# Patient Record
Sex: FEMALE
[De-identification: names, ages, dates, MRNs, and addresses within clinical notes are randomized per-mention and may not be internally consistent; named-entity substitution may affect disease eponyms.]

---

## 2019-03-06 ENCOUNTER — HOSPITAL ENCOUNTER (EMERGENCY)
Dept: HOSPITAL 84 - D.ER | Age: 19
LOS: 1 days | Discharge: HOME | End: 2019-03-07
Payer: COMMERCIAL

## 2019-03-06 VITALS — HEIGHT: 65 IN | WEIGHT: 150.32 LBS | BODY MASS INDEX: 25.04 KG/M2

## 2019-03-06 DIAGNOSIS — N93.8: Primary | ICD-10-CM

## 2019-03-06 LAB
ALBUMIN SERPL-MCNC: 3.8 G/DL (ref 3.4–5)
ALP SERPL-CCNC: 76 U/L (ref 46–116)
ALT SERPL-CCNC: 23 U/L (ref 10–68)
ANION GAP SERPL CALC-SCNC: 13.5 MMOL/L (ref 8–16)
APPEARANCE UR: CLEAR
BACTERIA #/AREA URNS HPF: (no result) /HPF
BILIRUB SERPL-MCNC: 0.16 MG/DL (ref 0.2–1.3)
BILIRUB SERPL-MCNC: NEGATIVE MG/DL
BUN SERPL-MCNC: 10 MG/DL (ref 7–18)
CALCIUM SERPL-MCNC: 8.7 MG/DL (ref 8.5–10.1)
CHLORIDE SERPL-SCNC: 105 MMOL/L (ref 98–107)
CO2 SERPL-SCNC: 27.1 MMOL/L (ref 21–32)
COLOR UR: (no result)
CREAT SERPL-MCNC: 0.8 MG/DL (ref 0.6–1.3)
ERYTHROCYTE [DISTWIDTH] IN BLOOD BY AUTOMATED COUNT: 13 % (ref 11.5–14.5)
GLOBULIN SER-MCNC: 3.8 G/L
GLUCOSE SERPL-MCNC: 90 MG/DL (ref 74–106)
GLUCOSE SERPL-MCNC: NEGATIVE MG/DL
HCG SERPL-ACNC: NEGATIVE M[IU]/ML
HCT VFR BLD CALC: 35.8 % (ref 36–48)
HGB BLD-MCNC: 11.9 G/DL (ref 12–16)
KETONES UR STRIP-MCNC: NEGATIVE MG/DL
LYMPHOCYTES NFR BLD AUTO: 17.9 % (ref 15–50)
MCH RBC QN AUTO: 27.2 PG (ref 26–34)
MCHC RBC AUTO-ENTMCNC: 33.2 G/DL (ref 31–37)
MCV RBC: 81.7 FL (ref 80–100)
NEUTROPHILS NFR BLD AUTO: 65.2 % (ref 40–80)
NITRITE UR-MCNC: NEGATIVE MG/ML
OSMOLALITY SERPL CALC.SUM OF ELEC: 281 MOSM/KG (ref 275–300)
PH UR STRIP: 8 [PH] (ref 5–6)
PLATELET # BLD: 138 10X3/UL (ref 130–400)
PMV BLD AUTO: 12.2 FL (ref 7.4–10.4)
POTASSIUM SERPL-SCNC: 3.6 MMOL/L (ref 3.5–5.1)
PROT SERPL-MCNC: 7.6 G/DL (ref 6.4–8.2)
PROT UR-MCNC: NEGATIVE MG/DL
RBC # BLD AUTO: 4.38 10X6/UL (ref 4–5.4)
RBC #/AREA URNS HPF: (no result) /HPF (ref 0–5)
SODIUM SERPL-SCNC: 142 MMOL/L (ref 136–145)
SP GR UR STRIP: 1 (ref 1–1.02)
SQUAMOUS #/AREA URNS HPF: (no result) /HPF (ref 0–5)
UROBILINOGEN UR-MCNC: NORMAL MG/DL
WBC # BLD AUTO: 4.9 10X3/UL (ref 4.8–10.8)
WBC #/AREA URNS HPF: (no result) /HPF (ref 0–5)

## 2019-03-07 VITALS — SYSTOLIC BLOOD PRESSURE: 125 MMHG | DIASTOLIC BLOOD PRESSURE: 74 MMHG

## 2020-09-23 ENCOUNTER — HOSPITAL ENCOUNTER (OUTPATIENT)
Dept: HOSPITAL 84 - D.LDO | Age: 20
Discharge: HOME | End: 2020-09-23
Attending: OBSTETRICS & GYNECOLOGY
Payer: SELF-PAY

## 2020-09-23 VITALS — BODY MASS INDEX: 25 KG/M2

## 2020-09-23 DIAGNOSIS — O35.9XX0: Primary | ICD-10-CM

## 2020-09-23 LAB
ALT SERPL-CCNC: 16 U/L (ref 10–68)
ANION GAP SERPL CALC-SCNC: 13.9 MMOL/L (ref 8–16)
BUN SERPL-MCNC: 6 MG/DL (ref 7–18)
CALCIUM SERPL-MCNC: 8.7 MG/DL (ref 8.5–10.1)
CHLORIDE SERPL-SCNC: 105 MMOL/L (ref 98–107)
CO2 SERPL-SCNC: 22.5 MMOL/L (ref 21–32)
CREAT SERPL-MCNC: 0.6 MG/DL (ref 0.6–1.3)
ERYTHROCYTE [DISTWIDTH] IN BLOOD BY AUTOMATED COUNT: 12.2 % (ref 11.5–14.5)
GLUCOSE SERPL-MCNC: 109 MG/DL (ref 74–106)
HCT VFR BLD CALC: 35.2 % (ref 36–48)
HGB BLD-MCNC: 11.8 G/DL (ref 12–16)
MCH RBC QN AUTO: 28.6 PG (ref 26–34)
MCHC RBC AUTO-ENTMCNC: 33.5 G/DL (ref 31–37)
MCV RBC: 85.4 FL (ref 80–100)
OSMOLALITY SERPL CALC.SUM OF ELEC: 274 MOSM/KG (ref 275–300)
PLATELET # BLD: 139 10X3/UL (ref 130–400)
PMV BLD AUTO: 11.7 FL (ref 7.4–10.4)
POTASSIUM SERPL-SCNC: 3.4 MMOL/L (ref 3.5–5.1)
RBC # BLD AUTO: 4.12 10X6/UL (ref 4–5.4)
SODIUM SERPL-SCNC: 138 MMOL/L (ref 136–145)
URATE SERPL-MCNC: 5.2 MG/DL (ref 2.6–7.2)
WBC # BLD AUTO: 7.4 10X3/UL (ref 4.8–10.8)

## 2020-09-30 ENCOUNTER — HOSPITAL ENCOUNTER (INPATIENT)
Dept: HOSPITAL 84 - D.LD | Age: 20
LOS: 1 days | Discharge: HOME | DRG: 951 | End: 2020-10-01
Attending: OBSTETRICS & GYNECOLOGY | Admitting: OBSTETRICS & GYNECOLOGY
Payer: MEDICAID

## 2020-09-30 VITALS
WEIGHT: 186.39 LBS | WEIGHT: 186.39 LBS | BODY MASS INDEX: 29.96 KG/M2 | BODY MASS INDEX: 29.96 KG/M2 | HEIGHT: 66 IN | HEIGHT: 66 IN

## 2020-09-30 DIAGNOSIS — Z34.93: Primary | ICD-10-CM

## 2020-09-30 DIAGNOSIS — Z3A.39: ICD-10-CM

## 2020-09-30 LAB
ERYTHROCYTE [DISTWIDTH] IN BLOOD BY AUTOMATED COUNT: 12.2 % (ref 11.5–14.5)
HCT VFR BLD CALC: 35.7 % (ref 36–48)
HGB BLD-MCNC: 12.1 G/DL (ref 12–16)
MCH RBC QN AUTO: 28.7 PG (ref 26–34)
MCHC RBC AUTO-ENTMCNC: 33.9 G/DL (ref 31–37)
MCV RBC: 84.8 FL (ref 80–100)
PLATELET # BLD: 170 10X3/UL (ref 130–400)
PMV BLD AUTO: 12.1 FL (ref 7.4–10.4)
RBC # BLD AUTO: 4.21 10X6/UL (ref 4–5.4)
WBC # BLD AUTO: 9.9 10X3/UL (ref 4.8–10.8)

## 2020-10-01 VITALS — SYSTOLIC BLOOD PRESSURE: 104 MMHG | DIASTOLIC BLOOD PRESSURE: 51 MMHG

## 2020-10-01 LAB
AMPHETAMINES UR QL SCN: NEGATIVE QUAL
BARBITURATES UR QL SCN: NEGATIVE QUAL
BENZODIAZ UR QL SCN: NEGATIVE QUAL
BZE UR QL SCN: NEGATIVE QUAL
CANNABINOIDS UR QL SCN: NEGATIVE QUAL
OPIATES UR QL SCN: NEGATIVE QUAL
PCP UR QL SCN: NEGATIVE QUAL

## 2020-10-02 ENCOUNTER — HOSPITAL ENCOUNTER (OUTPATIENT)
Dept: HOSPITAL 84 - D.LDO | Age: 20
Discharge: HOME | End: 2020-10-02
Attending: OBSTETRICS & GYNECOLOGY
Payer: MEDICAID

## 2020-10-02 VITALS — BODY MASS INDEX: 30 KG/M2

## 2020-10-02 DIAGNOSIS — O35.9XX0: Primary | ICD-10-CM

## 2020-10-04 ENCOUNTER — HOSPITAL ENCOUNTER (INPATIENT)
Dept: HOSPITAL 84 - D.LD | Age: 20
LOS: 2 days | Discharge: HOME | End: 2020-10-06
Attending: OBSTETRICS & GYNECOLOGY | Admitting: OBSTETRICS & GYNECOLOGY
Payer: MEDICAID

## 2020-10-04 VITALS — DIASTOLIC BLOOD PRESSURE: 90 MMHG | SYSTOLIC BLOOD PRESSURE: 126 MMHG

## 2020-10-04 VITALS — BODY MASS INDEX: 29.73 KG/M2 | WEIGHT: 185 LBS | HEIGHT: 66 IN

## 2020-10-04 DIAGNOSIS — Z3A.39: ICD-10-CM

## 2020-10-04 LAB
AMPHETAMINES UR QL SCN: NEGATIVE QUAL
BARBITURATES UR QL SCN: NEGATIVE QUAL
BASOPHILS NFR BLD AUTO: 0.1 % (ref 0–2)
BENZODIAZ UR QL SCN: NEGATIVE QUAL
BILIRUB SERPL-MCNC: NEGATIVE MG/DL
BZE UR QL SCN: NEGATIVE QUAL
CANNABINOIDS UR QL SCN: NEGATIVE QUAL
EOSINOPHIL NFR BLD: 0.8 % (ref 0–7)
ERYTHROCYTE [DISTWIDTH] IN BLOOD BY AUTOMATED COUNT: 12.1 % (ref 11.5–14.5)
HCT VFR BLD CALC: 36 % (ref 36–48)
HGB BLD-MCNC: 11.9 G/DL (ref 12–16)
IMM GRANULOCYTES NFR BLD: 0.8 % (ref 0–5)
KETONES UR STRIP-MCNC: NEGATIVE MG/DL
LYMPHOCYTES NFR BLD AUTO: 21.8 % (ref 15–50)
MCH RBC QN AUTO: 28.2 PG (ref 26–34)
MCHC RBC AUTO-ENTMCNC: 33.1 G/DL (ref 31–37)
MCV RBC: 85.3 FL (ref 80–100)
MONOCYTES NFR BLD: 10.4 % (ref 2–11)
NEUTROPHILS NFR BLD AUTO: 66.1 % (ref 40–80)
NITRITE UR-MCNC: NEGATIVE MG/ML
OPIATES UR QL SCN: NEGATIVE QUAL
PCP UR QL SCN: NEGATIVE QUAL
PH UR STRIP: 6 [PH] (ref 5–8)
PLATELET # BLD: 164 10X3/UL (ref 130–400)
PMV BLD AUTO: 12.3 FL (ref 7.4–10.4)
RBC # BLD AUTO: 4.22 10X6/UL (ref 4–5.4)
UROBILINOGEN UR-MCNC: NORMAL MG/DL (ref ?–2)
WBC # BLD AUTO: 8 10X3/UL (ref 4.8–10.8)

## 2020-10-05 VITALS — DIASTOLIC BLOOD PRESSURE: 76 MMHG | SYSTOLIC BLOOD PRESSURE: 130 MMHG

## 2020-10-05 PROCEDURE — 0HQ9XZZ REPAIR PERINEUM SKIN, EXTERNAL APPROACH: ICD-10-PCS | Performed by: OBSTETRICS & GYNECOLOGY

## 2020-10-05 NOTE — NUR
RN TO PT BEDSIDE, PT WITH INFANT IN HER ARMS, PT REQUESTS MORE PADS AT THIS
TIME, PT PROVIDED WITH MORE PADS AND BRIEFS. PT DENIES ANY OTHER NEEDS.
SIGNIFICANT OTHER AT BEDSIDE FOR SUPPORT. BED IN LOWEST POSITION, SIDE RAILS
UPX2, CALL LIGHT IN REACH.

## 2020-10-05 NOTE — NUR
RN TO PT BEDSIDE FOR ROUNDING, PT AND FOB ON COUCH BONDING WITH INFANT. PT
DENIES ANY NEEDS AT THIS TIME. PT TO CALL OUT IF SHE HAS ANY NEEDS.

## 2020-10-05 NOTE — NUR
RN TO PT BEDSIDE, VSS. FUNDUS FIRM, MIDLINE, 2 BELOW, SCANT RUBRA LOCHIA, NO
CLOTS, NEW GOWN, BRIEFS AND PADS PROVIDED, PT STATES PAIN IS 1-2/10 ON PAIN
SCALE TO PERINEUM, PT DENIES ANY NEEDS AT THIS TIME. FOB AT BEDSIDE, INFANT IN
CRIB AT THIS TIME.

## 2020-10-06 VITALS — DIASTOLIC BLOOD PRESSURE: 61 MMHG | SYSTOLIC BLOOD PRESSURE: 119 MMHG

## 2020-10-06 LAB
ERYTHROCYTE [DISTWIDTH] IN BLOOD BY AUTOMATED COUNT: 12.1 % (ref 11.5–14.5)
HCT VFR BLD CALC: 34.5 % (ref 36–48)
HGB BLD-MCNC: 11.4 G/DL (ref 12–16)
MCH RBC QN AUTO: 28 PG (ref 26–34)
MCHC RBC AUTO-ENTMCNC: 33 G/DL (ref 31–37)
MCV RBC: 84.8 FL (ref 80–100)
PLATELET # BLD: 167 10X3/UL (ref 130–400)
PMV BLD AUTO: 12 FL (ref 7.4–10.4)
RBC # BLD AUTO: 4.07 10X6/UL (ref 4–5.4)
WBC # BLD AUTO: 9.9 10X3/UL (ref 4.8–10.8)

## 2020-10-06 NOTE — NUR
PT SITTING UP IN BED. VSS. HRRR WITHOUT AUDIBLE MURMUR. BBS CLEAR. BS X 4.
ABDOMEN SOFT/NON-DISTENDED. FUNDUS FIRM AT U/U. RUBRA LOCHIA SMALL AMT.
LABIA WITH MILD EDEMA NOTED. PT DENIES HEAVY BLEEDING OR PASSING CLOTS. NEG
HOMANS' SIGN. PPP. SR UP X 2. CALL LIGHT IN REACH. INFANT RETURNED TO NURSERY
PER PT REQUEST TO REST.

## 2020-10-06 NOTE — NUR
RN TO PT BEDSIDE FOR ROUNDING, INFANT IN PT'S ARMS, PT DENIES ANY
PAIN/DISCOMFORT, FUNDUS IS FIRM, MIDLINE 2 BELOW, SCANT RUBRA LOCHIA, NO
CLOTS, PT DENIES ANY NEEDS AT THIS TIME, PT AWARE TO CALL OUT IF SHE NEEDS
ANYTHING, BED IN LOWEST POSITION, SIDE RAILS UPX2, CALL LIGHT IN REACH.

## 2020-10-06 NOTE — NUR
PT READY FOR DISCHARGE. DISCHARGED WITH INFANT IN STABLE CONDITION VIA
WHEELCHAIR PER DENNIS SCALES RN TO PRIVATE VEHICLE.

## 2020-10-06 NOTE — NUR
DISCHARGE INSTRUCTIONS GIVEN TO PT PT VERBALIZES UNDERSTANDING OF ALL
INSTRUCTIONS. COPIES GIVEN TO PT. PT PREPARES FOR DISCHARGE.

## 2020-10-06 NOTE — NUR
PT SITTING UP IN BED. BREASTFEEDING INFANT. C/O PAIN TO PERINEUM. TORADOL 10
MG GIVEN PO AS ORDERED.

## 2020-10-06 NOTE — NUR
RN TO PT BEDSIDE, TYLENOL 1000MG PO ADMINISTERED PER MD ORDERS. FOB AT BEDSIDE
ASLEEP, MOTHER IN BED BONDING WITH INFANT. PT DENIES ANY NEEDS AT THIS TIME.